# Patient Record
Sex: MALE | Race: WHITE | URBAN - METROPOLITAN AREA
[De-identification: names, ages, dates, MRNs, and addresses within clinical notes are randomized per-mention and may not be internally consistent; named-entity substitution may affect disease eponyms.]

---

## 2019-03-22 ENCOUNTER — IMPORTED ENCOUNTER (OUTPATIENT)
Dept: URBAN - METROPOLITAN AREA CLINIC 50 | Facility: CLINIC | Age: 84
End: 2019-03-22

## 2019-03-22 NOTE — PATIENT DISCUSSION
"""Mild hypertensive retinopathy OU. Discussed with patient the signs and associated risks of potentially permanent damage to ocular structures due to systemic hypertension.  Stressed the importance of maintaining a healthy

## 2019-10-29 ENCOUNTER — IMPORTED ENCOUNTER (OUTPATIENT)
Dept: URBAN - METROPOLITAN AREA CLINIC 50 | Facility: CLINIC | Age: 84
End: 2019-10-29

## 2020-11-03 ENCOUNTER — IMPORTED ENCOUNTER (OUTPATIENT)
Dept: URBAN - METROPOLITAN AREA CLINIC 50 | Facility: CLINIC | Age: 85
End: 2020-11-03

## 2021-04-17 ASSESSMENT — VISUAL ACUITY
OD_CC: 20/25-2
OS_CC: J1+
OD_CC: 20/25-1
OD_CC: J1+
OS_CC: 20/25-1
OS_CC: 20/30-2
OD_PH: 20/30-1
OD_CC: J1+
OS_OTHER: 20/200.
OD_OTHER: 20/200.
OS_CC: 20/40-2
OS_CC: J2
OD_CC: J2
OS_CC: J1+
OD_CC: 20/40-1

## 2021-04-17 ASSESSMENT — TONOMETRY
OS_IOP_MMHG: 12
OD_IOP_MMHG: 10
OS_IOP_MMHG: 11
OD_IOP_MMHG: 10
OS_IOP_MMHG: 10
OD_IOP_MMHG: 10

## 2023-01-25 NOTE — PATIENT DISCUSSION
Signs and symptoms of infection were discussed. Patient instructed to call back with any changes to vision.